# Patient Record
Sex: MALE | Race: BLACK OR AFRICAN AMERICAN | URBAN - METROPOLITAN AREA
[De-identification: names, ages, dates, MRNs, and addresses within clinical notes are randomized per-mention and may not be internally consistent; named-entity substitution may affect disease eponyms.]

---

## 2019-06-03 ENCOUNTER — HISTORICAL (OUTPATIENT)
Dept: ADMINISTRATIVE | Facility: HOSPITAL | Age: 79
End: 2019-06-03

## 2022-04-30 NOTE — OP NOTE
Patient:   Vinny Meirno            MRN: [id number]            FIN: [id number]               Age:   78 years     Sex:  Male     :  1940   Associated Diagnoses:   End stage renal disease on dialysis   Author:   Trav Stevenson MD      Operative Note   Operative Information   Date/ Time:  6/3/2019 14:56:00.     Procedures Performed: Left radiocephalic fistula   .     Indications: Mr. Merino is a 79 y/o man with ESRD who needs long term dialysis access.  He presents today for creation of a left upper extremity arteriovenous fistula..     Preoperative Diagnosis: End stage renal disease on dialysis ([id number] N18.6).     Postoperative Diagnosis: End stage renal disease on dialysis ([id number] N18.6).     Surgeon: Trav Stevenson MD.     Assistant: Rula Pastor.     Speciman Removed: none   .     Esimated blood loss: loss less than  100  cc.     Description of Procedure/Findings/    Complications: The risks and benefits of the procedure were discussed with the patient prior to the procedure and the patient desires to proceed..   An regional block was placed prior to arriving in the room..  The left arm was prepped in the usual sterile fashion.  Cefzol was administered prior to the incision.  An appropriate time out was performed.  Ultrasound was utlilized to identify the cephalic vein.   A longitudinal incision was made between the radial artery and the cephalic vein.   Dissection was performed through the superficial soft tissues and then followed the appropriate plane laterally and medially to identify the cephalic vein and radial artery.  Each of these vessels were isolated and side branches were ligated with 4-0 silk when appropriate.  The cephalic vein was ligated distally with a 3-0 silk. The radial artery had some mild disease but appeared appropriate for fistula creation.  A longitudinal incision was made on the radial artery.  The vein was spatulated appropriately.  Anastamosis  was created with a 6-0 prolene suture. Hemostasis was obtained and the soft tissue was dissected to allow for appropriate lay of the vein graft.  3-0 vicryl was utilized to close the subcutaneous structures and a 4-0 monocryl was utlilzed to close the skin.  There was some swelling to the wound so the wound was reopened and explored.  Small hematoma present - bleeder identified and controlled.  Remains with tissue edema but no further bleeding.  thrombin used.  Watched for some time with no recurrent bleeding. Woiund closed again in similar fashion. Dermabond dressing was used to dress the wound.  Good thrill at this point.  This completed the procedure.     Rula Copeland assisted throughout case.  Assisted wtih anastamosis. She performed wound closure.   .     Findings:    ,    .     Complications: None.

## 2022-04-30 NOTE — CONSULTS
Patient:   Vinny Merino            MRN: [id number]            FIN: [id number]               Age:   78 years     Sex:  Male     :  1940   Associated Diagnoses:   None   Author:   Rula Pastor      Brief Operative Note   Operative Information   Date/ Time:  6/3/2019 15:03:00.     Preoperative Diagnosis: ESRD.     Postoperative Diagnosis: ESRD.     Procedures Performed: Left radiocephalic fistula creation.     Surgeon: Trav Stevenson MD     Assistant: Rula Pastor.     Esimated blood loss: loss less than  20  cc.